# Patient Record
Sex: FEMALE | Race: WHITE
[De-identification: names, ages, dates, MRNs, and addresses within clinical notes are randomized per-mention and may not be internally consistent; named-entity substitution may affect disease eponyms.]

---

## 2019-12-27 ENCOUNTER — HOSPITAL ENCOUNTER (EMERGENCY)
Dept: HOSPITAL 92 - ERS | Age: 29
LOS: 1 days | Discharge: HOME | End: 2019-12-28
Payer: SELF-PAY

## 2019-12-27 DIAGNOSIS — Z79.899: ICD-10-CM

## 2019-12-27 DIAGNOSIS — M54.42: ICD-10-CM

## 2019-12-27 DIAGNOSIS — M54.41: Primary | ICD-10-CM

## 2019-12-27 LAB
ALBUMIN SERPL BCG-MCNC: 4.4 G/DL (ref 3.5–5)
ALP SERPL-CCNC: 96 U/L (ref 40–110)
ALT SERPL W P-5'-P-CCNC: 14 U/L (ref 8–55)
ANION GAP SERPL CALC-SCNC: 11 MMOL/L (ref 10–20)
AST SERPL-CCNC: 21 U/L (ref 5–34)
BASOPHILS # BLD AUTO: 0 THOU/UL (ref 0–0.2)
BASOPHILS NFR BLD AUTO: 0.4 % (ref 0–1)
BILIRUB SERPL-MCNC: 0.3 MG/DL (ref 0.2–1.2)
BUN SERPL-MCNC: 14 MG/DL (ref 7–18.7)
CALCIUM SERPL-MCNC: 9.9 MG/DL (ref 7.8–10.44)
CHLORIDE SERPL-SCNC: 106 MMOL/L (ref 98–107)
CO2 SERPL-SCNC: 26 MMOL/L (ref 22–29)
CREAT CL PREDICTED SERPL C-G-VRATE: 0 ML/MIN (ref 70–130)
EOSINOPHIL # BLD AUTO: 0.1 THOU/UL (ref 0–0.7)
EOSINOPHIL NFR BLD AUTO: 1.2 % (ref 0–10)
GLOBULIN SER CALC-MCNC: 2.7 G/DL (ref 2.4–3.5)
GLUCOSE SERPL-MCNC: 98 MG/DL (ref 70–105)
HGB BLD-MCNC: 14.1 G/DL (ref 12–16)
LYMPHOCYTES # BLD: 2.3 THOU/UL (ref 1.2–3.4)
LYMPHOCYTES NFR BLD AUTO: 35.8 % (ref 21–51)
MCH RBC QN AUTO: 29.2 PG (ref 27–31)
MCV RBC AUTO: 87.8 FL (ref 78–98)
MONOCYTES # BLD AUTO: 0.4 THOU/UL (ref 0.11–0.59)
MONOCYTES NFR BLD AUTO: 5.4 % (ref 0–10)
NEUTROPHILS # BLD AUTO: 3.7 THOU/UL (ref 1.4–6.5)
NEUTROPHILS NFR BLD AUTO: 57.2 % (ref 42–75)
PLATELET # BLD AUTO: 261 THOU/UL (ref 130–400)
POTASSIUM SERPL-SCNC: 4 MMOL/L (ref 3.5–5.1)
PREGU CONTROL BACKGROUND?: (no result)
PREGU CONTROL BAR APPEAR?: YES
RBC # BLD AUTO: 4.84 MILL/UL (ref 4.2–5.4)
SODIUM SERPL-SCNC: 139 MMOL/L (ref 136–145)
WBC # BLD AUTO: 6.5 THOU/UL (ref 4.8–10.8)

## 2019-12-27 PROCEDURE — 72158 MRI LUMBAR SPINE W/O & W/DYE: CPT

## 2019-12-27 PROCEDURE — 86140 C-REACTIVE PROTEIN: CPT

## 2019-12-27 PROCEDURE — 85652 RBC SED RATE AUTOMATED: CPT

## 2019-12-27 PROCEDURE — 81003 URINALYSIS AUTO W/O SCOPE: CPT

## 2019-12-27 PROCEDURE — 81025 URINE PREGNANCY TEST: CPT

## 2019-12-27 PROCEDURE — 96374 THER/PROPH/DIAG INJ IV PUSH: CPT

## 2019-12-27 PROCEDURE — 80053 COMPREHEN METABOLIC PANEL: CPT

## 2019-12-27 PROCEDURE — 85025 COMPLETE CBC W/AUTO DIFF WBC: CPT

## 2019-12-27 NOTE — MRI
MRI OF THE LUMBAR SPINE WITH AND WITHOUT CONTRAST:

12/27/19

 

COMPARISON: 

None.

 

HISTORY: 

Low back pain.

 

TECHNIQUE:  

Multiplanar and multisequence MR imaging of the lumbar spine obtained with and without contrast. 

 

FINDINGS: 

There is no significant anterolisthesis or retrolisthesis. STIR imaging demonstrates no focal area of
 osseous marrow edema. On the basis of five lumbar type vertebral bodies, conus medullaris terminates
 at L1.

 

T12-L1: Intervertebral disc height and signal intensity appears within normal limits. No central cuate
l or neural foraminal stenosis. 

 

L1-2: Intervertebral disc height and signal intensity within normal limits with no central canal or n
eural foraminal stenosis. 

 

L2-3: Intervertebral disc height and signal intensity within normal limits. No central canal or neura
l foraminal stenosis. 

 

L3-4: Intervertebral disc height and signal intensity within normal limits. No central canal or neura
l foraminal stenosis.

 

L4-5: There is disc space narrowing with disc desiccation. There is a central disc protrusion with mi
ld central canal stenosis. No significant neural foraminal stenosis. 

 

 

 

L5-S1: There is a small left paracentral disc protrusion. There is mild disc desiccation and disc spa
ce narrowing. No significant central canal or neural foraminal stenosis. 

 

The imaged retroperitoneal structures appear grossly unremarkable. Post contrast imaging demonstrates
 no abnormal enhancement within the lumbar spine.

 

IMPRESSION: 

Mild lower lumbar spine degenerative disc disease as detailed above. 

 

POS: [facility]